# Patient Record
Sex: FEMALE | Race: WHITE | NOT HISPANIC OR LATINO | URBAN - METROPOLITAN AREA
[De-identification: names, ages, dates, MRNs, and addresses within clinical notes are randomized per-mention and may not be internally consistent; named-entity substitution may affect disease eponyms.]

---

## 2017-02-21 ENCOUNTER — HISTORICAL (OUTPATIENT)
Dept: LAB | Facility: HOSPITAL | Age: 62
End: 2017-02-21

## 2018-08-08 ENCOUNTER — HISTORICAL (OUTPATIENT)
Dept: LAB | Facility: HOSPITAL | Age: 63
End: 2018-08-08

## 2018-08-08 ENCOUNTER — HISTORICAL (OUTPATIENT)
Dept: ADMINISTRATIVE | Facility: HOSPITAL | Age: 63
End: 2018-08-08

## 2018-08-08 LAB
APPEARANCE, UA: ABNORMAL
BACTERIA #/AREA URNS AUTO: ABNORMAL /HPF
BILIRUB UR QL STRIP: NEGATIVE MG/DL
COLOR UR: YELLOW
GLUCOSE (UA): NEGATIVE MG/DL
HGB UR QL STRIP: ABNORMAL UNIT/L
KETONES UR QL STRIP: NEGATIVE MG/DL
LEUKOCYTE ESTERASE UR QL STRIP: ABNORMAL UNIT/L
NITRITE UR QL STRIP.AUTO: NEGATIVE
PH UR STRIP: 7 [PH]
PROT UR QL STRIP: NEGATIVE MG/DL
RBC #/AREA URNS HPF: ABNORMAL /HPF
SP GR UR STRIP: 1.01
SQUAMOUS EPITHELIAL, UA: ABNORMAL /LPF
UROBILINOGEN UR STRIP-ACNC: 0.2 MG/DL
WBC #/AREA URNS AUTO: ABNORMAL /[HPF]

## 2019-03-26 ENCOUNTER — HISTORICAL (OUTPATIENT)
Dept: ADMINISTRATIVE | Facility: HOSPITAL | Age: 64
End: 2019-03-26

## 2019-03-26 LAB
APPEARANCE, UA: CLEAR
BACTERIA #/AREA URNS AUTO: ABNORMAL /HPF
BILIRUB UR QL STRIP: NEGATIVE MG/DL
COLOR UR: ABNORMAL
GLUCOSE (UA): NEGATIVE MG/DL
HGB UR QL STRIP: ABNORMAL UNIT/L
KETONES UR QL STRIP: NEGATIVE MG/DL
LEUKOCYTE ESTERASE UR QL STRIP: ABNORMAL UNIT/L
NITRITE UR QL STRIP.AUTO: NEGATIVE
PH UR STRIP: 6 [PH]
PROT UR QL STRIP: NEGATIVE MG/DL
RBC #/AREA URNS HPF: ABNORMAL /HPF
SP GR UR STRIP: 1.01
SQUAMOUS EPITHELIAL, UA: ABNORMAL /LPF
UROBILINOGEN UR STRIP-ACNC: 0.2 MG/DL
WBC #/AREA URNS AUTO: ABNORMAL /[HPF]

## 2020-08-04 ENCOUNTER — HISTORICAL (OUTPATIENT)
Dept: ADMINISTRATIVE | Facility: HOSPITAL | Age: 65
End: 2020-08-04

## 2020-08-28 ENCOUNTER — HISTORICAL (OUTPATIENT)
Dept: ADMINISTRATIVE | Facility: HOSPITAL | Age: 65
End: 2020-08-28

## 2020-10-02 ENCOUNTER — HISTORICAL (OUTPATIENT)
Dept: ADMINISTRATIVE | Facility: HOSPITAL | Age: 65
End: 2020-10-02

## 2020-10-02 LAB
ABS NEUT (OLG): 4.1 X10(3)/MCL (ref 2.1–9.2)
ALBUMIN SERPL-MCNC: 4.3 GM/DL (ref 3.4–5)
ALBUMIN/GLOB SERPL: 1.65 {RATIO} (ref 1.5–2.5)
ALP SERPL-CCNC: 83 UNIT/L (ref 38–126)
ALT SERPL-CCNC: 17 UNIT/L (ref 7–52)
APPEARANCE, UA: CLEAR
AST SERPL-CCNC: 19 UNIT/L (ref 15–37)
BACTERIA #/AREA URNS AUTO: NORMAL /HPF
BILIRUB SERPL-MCNC: 0.6 MG/DL (ref 0.2–1)
BILIRUB UR QL STRIP: NEGATIVE MG/DL
BILIRUBIN DIRECT+TOT PNL SERPL-MCNC: 0.1 MG/DL (ref 0–0.5)
BILIRUBIN DIRECT+TOT PNL SERPL-MCNC: 0.5 MG/DL
BUN SERPL-MCNC: 15 MG/DL (ref 7–18)
CALCIUM SERPL-MCNC: 9.7 MG/DL (ref 8.5–10)
CHLORIDE SERPL-SCNC: 105 MMOL/L (ref 98–107)
CHOLEST SERPL-MCNC: 190 MG/DL (ref 0–200)
CHOLEST/HDLC SERPL: 3.4 {RATIO}
CO2 SERPL-SCNC: 27 MMOL/L (ref 21–32)
COLOR UR: YELLOW
CREAT SERPL-MCNC: 0.76 MG/DL (ref 0.6–1.3)
DEPRECATED CALCIDIOL+CALCIFEROL SERPL-MC: 35.8 NG/ML (ref 30–80)
ERYTHROCYTE [DISTWIDTH] IN BLOOD BY AUTOMATED COUNT: 12.1 % (ref 11.5–17)
GLOBULIN SER-MCNC: 2.6 GM/DL (ref 1.2–3)
GLUCOSE (UA): NEGATIVE MG/DL
GLUCOSE SERPL-MCNC: 100 MG/DL (ref 74–106)
HCT VFR BLD AUTO: 42.5 % (ref 37–47)
HDLC SERPL-MCNC: 56 MG/DL (ref 35–60)
HGB BLD-MCNC: 14.2 GM/DL (ref 12–16)
HGB UR QL STRIP: NEGATIVE UNIT/L
KETONES UR QL STRIP: NEGATIVE MG/DL
LDLC SERPL CALC-MCNC: 118 MG/DL (ref 0–129)
LEUKOCYTE ESTERASE UR QL STRIP: NORMAL UNIT/L
LYMPHOCYTES # BLD AUTO: 2.5 X10(3)/MCL (ref 0.6–3.4)
LYMPHOCYTES NFR BLD AUTO: 34.4 % (ref 13–40)
MCH RBC QN AUTO: 32.3 PG (ref 27–31.2)
MCHC RBC AUTO-ENTMCNC: 33 GM/DL (ref 32–36)
MCV RBC AUTO: 97 FL (ref 80–94)
MONOCYTES # BLD AUTO: 0.6 X10(3)/MCL (ref 0.1–1.3)
MONOCYTES NFR BLD AUTO: 9 % (ref 0.1–24)
NEUTROPHILS NFR BLD AUTO: 56.6 % (ref 47–80)
NITRITE UR QL STRIP.AUTO: NEGATIVE
PH UR STRIP: 6.5 [PH]
PLATELET # BLD AUTO: 213 X10(3)/MCL (ref 130–400)
PMV BLD AUTO: 9.6 FL (ref 9.4–12.4)
POTASSIUM SERPL-SCNC: 4.6 MMOL/L (ref 3.5–5.1)
PROT SERPL-MCNC: 6.9 GM/DL (ref 6.4–8.2)
PROT UR QL STRIP: NEGATIVE MG/DL
RBC # BLD AUTO: 4.39 X10(6)/MCL (ref 4.2–5.4)
RBC #/AREA URNS HPF: NORMAL /HPF
SODIUM SERPL-SCNC: 141 MMOL/L (ref 136–145)
SP GR UR STRIP: 1.02
SQUAMOUS EPITHELIAL, UA: NORMAL /LPF
TRIGL SERPL-MCNC: 155 MG/DL (ref 30–150)
UROBILINOGEN UR STRIP-ACNC: 0.2 MG/DL
VLDLC SERPL CALC-MCNC: 31 MG/DL
WBC # SPEC AUTO: 7.2 X10(3)/MCL (ref 4.5–11.5)
WBC #/AREA URNS AUTO: NORMAL /[HPF]

## 2021-01-12 ENCOUNTER — HISTORICAL (OUTPATIENT)
Dept: ADMINISTRATIVE | Facility: HOSPITAL | Age: 66
End: 2021-01-12

## 2021-01-12 LAB
ALBUMIN SERPL-MCNC: 3.9 GM/DL (ref 3.4–5)
ALBUMIN/GLOB SERPL: 1.26 {RATIO} (ref 1.5–2.5)
ALP SERPL-CCNC: 366 UNIT/L (ref 38–126)
ALT SERPL-CCNC: 154 UNIT/L (ref 7–52)
AST SERPL-CCNC: 40 UNIT/L (ref 15–37)
BILIRUB SERPL-MCNC: 0.7 MG/DL (ref 0.2–1)
BILIRUBIN DIRECT+TOT PNL SERPL-MCNC: 0.2 MG/DL (ref 0–0.5)
BILIRUBIN DIRECT+TOT PNL SERPL-MCNC: 0.5 MG/DL
BUN SERPL-MCNC: 16 MG/DL (ref 7–18)
CALCIUM SERPL-MCNC: 9.4 MG/DL (ref 8.5–10)
CHLORIDE SERPL-SCNC: 101 MMOL/L (ref 98–107)
CO2 SERPL-SCNC: 31 MMOL/L (ref 21–32)
CREAT SERPL-MCNC: 0.7 MG/DL (ref 0.6–1.3)
GLOBULIN SER-MCNC: 3.1 GM/DL (ref 1.2–3)
GLUCOSE SERPL-MCNC: 104 MG/DL (ref 74–106)
POTASSIUM SERPL-SCNC: 4.3 MMOL/L (ref 3.5–5.1)
PROT SERPL-MCNC: 7 GM/DL (ref 6.4–8.2)
SODIUM SERPL-SCNC: 138 MMOL/L (ref 136–145)

## 2021-01-20 ENCOUNTER — HISTORICAL (OUTPATIENT)
Dept: ADMINISTRATIVE | Facility: HOSPITAL | Age: 66
End: 2021-01-20

## 2021-01-20 LAB
APPEARANCE, UA: ABNORMAL
BACTERIA #/AREA URNS AUTO: ABNORMAL /HPF
BILIRUB UR QL STRIP: NEGATIVE MG/DL
COLOR UR: YELLOW
GLUCOSE (UA): NEGATIVE MG/DL
HGB UR QL STRIP: NEGATIVE UNIT/L
KETONES UR QL STRIP: NEGATIVE MG/DL
LEUKOCYTE ESTERASE UR QL STRIP: ABNORMAL UNIT/L
NITRITE UR QL STRIP.AUTO: NEGATIVE
PH UR STRIP: 7 [PH]
PROT UR QL STRIP: NEGATIVE MG/DL
RBC #/AREA URNS HPF: ABNORMAL /HPF
SP GR UR STRIP: 1.02
SQUAMOUS EPITHELIAL, UA: ABNORMAL /LPF
UROBILINOGEN UR STRIP-ACNC: 0.2 MG/DL
WBC #/AREA URNS AUTO: ABNORMAL /[HPF]

## 2021-01-27 ENCOUNTER — HISTORICAL (OUTPATIENT)
Dept: ADMINISTRATIVE | Facility: HOSPITAL | Age: 66
End: 2021-01-27

## 2021-01-27 LAB
ALBUMIN SERPL-MCNC: 4.1 GM/DL (ref 3.4–5)
ALBUMIN/GLOB SERPL: 1.58 {RATIO} (ref 1.5–2.5)
ALP SERPL-CCNC: 124 UNIT/L (ref 38–126)
ALT SERPL-CCNC: 18 UNIT/L (ref 7–52)
AST SERPL-CCNC: 19 UNIT/L (ref 15–37)
BILIRUB SERPL-MCNC: 0.6 MG/DL (ref 0.2–1)
BILIRUBIN DIRECT+TOT PNL SERPL-MCNC: 0.1 MG/DL (ref 0–0.5)
BILIRUBIN DIRECT+TOT PNL SERPL-MCNC: 0.5 MG/DL
BUN SERPL-MCNC: 12 MG/DL (ref 7–18)
CALCIUM SERPL-MCNC: 9.1 MG/DL (ref 8.5–10)
CHLORIDE SERPL-SCNC: 107 MMOL/L (ref 98–107)
CO2 SERPL-SCNC: 29 MMOL/L (ref 21–32)
CREAT SERPL-MCNC: 0.75 MG/DL (ref 0.6–1.3)
FERRITIN SERPL-MCNC: 342.97 NG/ML (ref 4.63–204)
GLOBULIN SER-MCNC: 2.6 GM/DL (ref 1.2–3)
GLUCOSE SERPL-MCNC: 97 MG/DL (ref 74–106)
POTASSIUM SERPL-SCNC: 4.2 MMOL/L (ref 3.5–5.1)
PROT SERPL-MCNC: 6.7 GM/DL (ref 6.4–8.2)
SODIUM SERPL-SCNC: 141 MMOL/L (ref 136–145)

## 2021-02-03 ENCOUNTER — HISTORICAL (OUTPATIENT)
Dept: ADMINISTRATIVE | Facility: HOSPITAL | Age: 66
End: 2021-02-03

## 2021-02-03 LAB
ANTINUCLEAR ANTIBODY SCREEN (OHS): NEGATIVE
APPEARANCE, UA: CLEAR
BACTERIA #/AREA URNS AUTO: ABNORMAL /HPF
BILIRUB UR QL STRIP: NEGATIVE MG/DL
COLOR UR: YELLOW
DEPRECATED CALCIDIOL+CALCIFEROL SERPL-MC: 36.1 NG/ML (ref 30–80)
DSDNA ANTIBODY (OHS): NEGATIVE
FERRITIN SERPL-MCNC: 324.13 NG/ML (ref 4.63–204)
GLUCOSE (UA): NEGATIVE MG/DL
HGB UR QL STRIP: NEGATIVE UNIT/L
KETONES UR QL STRIP: NEGATIVE MG/DL
LEUKOCYTE ESTERASE UR QL STRIP: ABNORMAL UNIT/L
NITRITE UR QL STRIP.AUTO: NEGATIVE
PH UR STRIP: 7 [PH]
PROT UR QL STRIP: NEGATIVE MG/DL
RBC #/AREA URNS HPF: ABNORMAL /HPF
RHEUMATOID FACT SERPL-ACNC: <13 IU/ML
SP GR UR STRIP: 1.02
SQUAMOUS EPITHELIAL, UA: ABNORMAL /LPF
UROBILINOGEN UR STRIP-ACNC: 0.2 MG/DL
WBC #/AREA URNS AUTO: ABNORMAL /[HPF]

## 2021-04-26 ENCOUNTER — HISTORICAL (OUTPATIENT)
Dept: ADMINISTRATIVE | Facility: HOSPITAL | Age: 66
End: 2021-04-26

## 2021-04-26 LAB
ALBUMIN SERPL-MCNC: 4.2 GM/DL (ref 3.4–5)
ALBUMIN/GLOB SERPL: 1.62 {RATIO} (ref 1.5–2.5)
ALP SERPL-CCNC: 80 UNIT/L (ref 38–126)
ALT SERPL-CCNC: 17 UNIT/L (ref 7–52)
APPEARANCE, UA: CLEAR
AST SERPL-CCNC: 19 UNIT/L (ref 15–37)
BACTERIA #/AREA URNS AUTO: ABNORMAL /HPF
BILIRUB SERPL-MCNC: 0.5 MG/DL (ref 0.2–1)
BILIRUB UR QL STRIP: NEGATIVE MG/DL
BILIRUBIN DIRECT+TOT PNL SERPL-MCNC: 0.1 MG/DL (ref 0–0.5)
BILIRUBIN DIRECT+TOT PNL SERPL-MCNC: 0.4 MG/DL
BUN SERPL-MCNC: 17 MG/DL (ref 7–18)
CALCIUM SERPL-MCNC: 9.3 MG/DL (ref 8.5–10)
CHLORIDE SERPL-SCNC: 101 MMOL/L (ref 98–107)
CHOLEST SERPL-MCNC: 168 MG/DL (ref 0–200)
CHOLEST/HDLC SERPL: 3.1 {RATIO}
CO2 SERPL-SCNC: 28 MMOL/L (ref 21–32)
COLOR UR: YELLOW
CREAT SERPL-MCNC: 0.68 MG/DL (ref 0.6–1.3)
DEPRECATED CALCIDIOL+CALCIFEROL SERPL-MC: 39.7 NG/ML (ref 30–80)
GLOBULIN SER-MCNC: 2.6 GM/DL (ref 1.2–3)
GLUCOSE (UA): NEGATIVE MG/DL
GLUCOSE SERPL-MCNC: 99 MG/DL (ref 74–106)
HDLC SERPL-MCNC: 55 MG/DL (ref 35–60)
HGB UR QL STRIP: NEGATIVE UNIT/L
KETONES UR QL STRIP: NEGATIVE MG/DL
LDLC SERPL CALC-MCNC: 89 MG/DL (ref 0–129)
LEUKOCYTE ESTERASE UR QL STRIP: ABNORMAL UNIT/L
NITRITE UR QL STRIP.AUTO: NEGATIVE
PH UR STRIP: 6.5 [PH]
POTASSIUM SERPL-SCNC: 4.1 MMOL/L (ref 3.5–5.1)
PROT SERPL-MCNC: 6.8 GM/DL (ref 6.4–8.2)
PROT UR QL STRIP: NEGATIVE MG/DL
RBC #/AREA URNS HPF: ABNORMAL /HPF
SODIUM SERPL-SCNC: 140 MMOL/L (ref 136–145)
SP GR UR STRIP: 1.02
SQUAMOUS EPITHELIAL, UA: ABNORMAL /LPF
TRIGL SERPL-MCNC: 149 MG/DL (ref 30–150)
UROBILINOGEN UR STRIP-ACNC: 0.2 MG/DL
VLDLC SERPL CALC-MCNC: 29.8 MG/DL
WBC #/AREA URNS AUTO: ABNORMAL /[HPF]

## 2021-05-05 ENCOUNTER — HISTORICAL (OUTPATIENT)
Dept: ADMINISTRATIVE | Facility: HOSPITAL | Age: 66
End: 2021-05-05

## 2021-05-07 LAB — FINAL CULTURE: NORMAL

## 2021-09-29 ENCOUNTER — HISTORICAL (OUTPATIENT)
Dept: ADMINISTRATIVE | Facility: HOSPITAL | Age: 66
End: 2021-09-29

## 2021-09-29 LAB
ABS NEUT (OLG): 3.5 X10(3)/MCL (ref 2.1–9.2)
ALBUMIN SERPL-MCNC: 4.3 GM/DL (ref 3.4–5)
ALBUMIN/GLOB SERPL: 1.72 {RATIO} (ref 1.5–2.5)
ALP SERPL-CCNC: 69 UNIT/L (ref 38–126)
ALT SERPL-CCNC: 16 UNIT/L (ref 7–52)
APPEARANCE, UA: CLEAR
AST SERPL-CCNC: 17 UNIT/L (ref 15–37)
BACTERIA #/AREA URNS AUTO: ABNORMAL /HPF
BILIRUB SERPL-MCNC: 0.6 MG/DL (ref 0.2–1)
BILIRUB UR QL STRIP: NEGATIVE MG/DL
BILIRUBIN DIRECT+TOT PNL SERPL-MCNC: 0.1 MG/DL (ref 0–0.5)
BILIRUBIN DIRECT+TOT PNL SERPL-MCNC: 0.5 MG/DL
BUN SERPL-MCNC: 16 MG/DL (ref 7–18)
CALCIUM SERPL-MCNC: 9.2 MG/DL (ref 8.5–10)
CHLORIDE SERPL-SCNC: 104 MMOL/L (ref 98–107)
CHOLEST SERPL-MCNC: 196 MG/DL (ref 0–200)
CHOLEST/HDLC SERPL: 3.9 {RATIO}
CO2 SERPL-SCNC: 30 MMOL/L (ref 21–32)
COLOR UR: ABNORMAL
CREAT SERPL-MCNC: 0.83 MG/DL (ref 0.6–1.3)
DEPRECATED CALCIDIOL+CALCIFEROL SERPL-MC: 50.1 NG/ML (ref 30–80)
ERYTHROCYTE [DISTWIDTH] IN BLOOD BY AUTOMATED COUNT: 12.2 % (ref 11.5–17)
GLOBULIN SER-MCNC: 2.5 GM/DL (ref 1.2–3)
GLUCOSE (UA): NEGATIVE MG/DL
GLUCOSE SERPL-MCNC: 105 MG/DL (ref 74–106)
HCT VFR BLD AUTO: 41.7 % (ref 37–47)
HDLC SERPL-MCNC: 50 MG/DL (ref 35–60)
HGB BLD-MCNC: 14.3 GM/DL (ref 12–16)
HGB UR QL STRIP: ABNORMAL UNIT/L
KETONES UR QL STRIP: ABNORMAL MG/DL
LDLC SERPL CALC-MCNC: 102 MG/DL (ref 0–129)
LEUKOCYTE ESTERASE UR QL STRIP: ABNORMAL UNIT/L
LYMPHOCYTES # BLD AUTO: 2.3 X10(3)/MCL (ref 0.6–3.4)
LYMPHOCYTES NFR BLD AUTO: 34.5 % (ref 13–40)
MCH RBC QN AUTO: 32.7 PG (ref 27–31.2)
MCHC RBC AUTO-ENTMCNC: 34 GM/DL (ref 32–36)
MCV RBC AUTO: 95 FL (ref 80–94)
MONOCYTES # BLD AUTO: 0.8 X10(3)/MCL (ref 0.1–1.3)
MONOCYTES NFR BLD AUTO: 11.4 % (ref 0.1–24)
NEUTROPHILS NFR BLD AUTO: 54.1 % (ref 47–80)
NITRITE UR QL STRIP.AUTO: NEGATIVE
PH UR STRIP: 6 [PH]
PLATELET # BLD AUTO: 207 X10(3)/MCL (ref 130–400)
PMV BLD AUTO: 10 FL (ref 9.4–12.4)
POTASSIUM SERPL-SCNC: 4.6 MMOL/L (ref 3.5–5.1)
PROT SERPL-MCNC: 6.8 GM/DL (ref 6.4–8.2)
PROT UR QL STRIP: ABNORMAL MG/DL
RBC # BLD AUTO: 4.37 X10(6)/MCL (ref 4.2–5.4)
RBC #/AREA URNS HPF: ABNORMAL /HPF
SODIUM SERPL-SCNC: 141 MMOL/L (ref 136–145)
SP GR UR STRIP: >1.03
SQUAMOUS EPITHELIAL, UA: ABNORMAL /LPF
T3FREE SERPL-MCNC: 2.2 PG/ML (ref 1.45–3.48)
T4 FREE SERPL-MCNC: 0.99 NG/DL (ref 0.76–1.46)
TRIGL SERPL-MCNC: 188 MG/DL (ref 30–150)
TSH SERPL-ACNC: 1.51 MIU/ML (ref 0.35–4.94)
UROBILINOGEN UR STRIP-ACNC: 0.2 MG/DL
VLDLC SERPL CALC-MCNC: 37.6 MG/DL
WBC # SPEC AUTO: 6.6 X10(3)/MCL (ref 4.5–11.5)
WBC #/AREA URNS AUTO: ABNORMAL /[HPF]

## 2022-04-10 ENCOUNTER — HISTORICAL (OUTPATIENT)
Dept: ADMINISTRATIVE | Facility: HOSPITAL | Age: 67
End: 2022-04-10

## 2022-04-11 ENCOUNTER — HISTORICAL (OUTPATIENT)
Dept: ADMINISTRATIVE | Facility: HOSPITAL | Age: 67
End: 2022-04-11

## 2022-04-28 VITALS
DIASTOLIC BLOOD PRESSURE: 81 MMHG | BODY MASS INDEX: 28.41 KG/M2 | HEIGHT: 69 IN | SYSTOLIC BLOOD PRESSURE: 127 MMHG | WEIGHT: 191.81 LBS

## 2022-04-28 VITALS
WEIGHT: 191.81 LBS | DIASTOLIC BLOOD PRESSURE: 81 MMHG | BODY MASS INDEX: 28.41 KG/M2 | HEIGHT: 69 IN | SYSTOLIC BLOOD PRESSURE: 127 MMHG

## 2022-04-30 NOTE — OP NOTE
Patient:   Maria C Toney             MRN: [id number]            FIN: [id number]               Age:   64 years     Sex:  Female     :  1955   Associated Diagnoses:   None   Author:   Hieu Waters MD      Preoperative Diagnosis: Cataract Left eye    Postoperative Diagnosis: Cataract Left eye    Procedure: Phacoemulsification with intaocular lens implantations Left eye    Surgeon: Hieu Waters MD    Assistant: Zunilda Demarco, [facility]    Anestheisa: MAC    Complications: None    The patient was brought into the operating suite, where the patient was correctly identified as was the operative eye via timeout.  The patient was prepped and draped in a sterile ophthalmic fashion.  A lid speculum was placed in the operative eye and the microscope was brought into place.  A 1.0mm paracentesis was then made at (6) o'clock.  The anterior chamber was filled with Endocoat.  A (temporal) clear corneal incision was made with a 2.4 mm keratome.  A 6 mm corneal marking ring was used to jair the cornea centered over the visual axis.  A 5.00 mm continuous curvilinear capsulorhexis was fashioned using a cystotome and microcapsular forceps.  Hydrodissection and hydrodelineation was performed with upreserved 1% Xylocaine.  The nucleus was then phacoemulsified with the Abbott phacoemulsification hand-piece with a total of (3) EFX.  The cortex was then removed with the I/A hand-piece. The lens model ([id number]) with a power of (21.0) was placed in the capsular bag.  The Helon was then removed from the eye with the I/A hand piece.  The anterior chamber was inflated and the wounds were hydrated with BSS.  The wounds were checked with Weck-Raquel sponges and found to be watertight.  The lid speculum was removed and topical antibiotics were placed on the operative eye.  The patient was brought to PACU in good condition.

## 2022-04-30 NOTE — OP NOTE
Patient:   Maria C Toney             MRN: [id number]            FIN: [id number]               Age:   64 years     Sex:  Female     :  1955   Associated Diagnoses:   None   Author:   Hieu Waters MD      PREOPERATIVE DIAGNOSIS: Cataract Right eye    POSTOPERATIVE DIAGNOSIS: Cataract Right eye    PROCEDURE: Phacoemulsification with intraocular lens implantations Right eye    SURGEON: Hieu Waters MD    ASSISTANT: Zunilda Demarco, [facility]    ANESTHEISA: MAC    COMPLICATIONS: NONE    DESCRIPTION OF PROCEDURE: The patient was to the [employer] laser.  The patient was marked at 0 & 180 degrees.   A time out was performed.  The capsulotomy and lens fragmentation were completed.  Then the patient was brought into the operating suite, where the patient was correctly identified as was the operative eye via timeout.  The patient was prepped and draped in a sterile ophthalmic fashion.  A lid speculum was placed in the operative eye and the microscope was brought into place.  The eye was then marked using a axis marker for the desired position of the IOL implant.  A 1.0 mm paracentesis was then made at (12) o'clock.  The anterior chamber was filled with Endocoat.  A (temporal) clear corneal incision was made with a 2.4 mm keratome blade.  A 6.0 mm corneal marking ring was used to jair the cornea centered over the visual axis.  A 5.00 mm continuous curvilinear capsulorhexis was fashioned using a cystotome and microcapsular forceps.  Hydrodissection and hydrodelineation was performed with unpreserved 1% Xylocaine.  The nucleus was then phacoemulsified with the Abbott phacoemulsification hand-piece with a total of (7) EFX.  The cortex was then removed with the I/A hand-piece.  The capsular bag was filled with Helon.  The lens model ([id number]) with the power of (22.0) was placed in the capsular bag at (30) degrees.  The Helon was then removed from the eye with the I/A hand -piece.  The anterior chamber was inflated and the  wounds were hydrated with BSS.  The wounds were checked with Weck-Raquel sponges and found to be watertight.  The lid speculum was removed and topical antibiotics were placed on the operative eye.  The patient was brought to the PACU in good condition.

## 2022-05-03 NOTE — HISTORICAL [facility] CERNER
This is a historical note converted from Jose Elias. Formatting and pictures may have been removed.  Please reference Jose Elias for original formatting and attached multimedia. Chief Complaint  2 WEEK   History of Present Illness  Patient presents to follow up regarding her elevated LFTs and UTI. Denies any UTI symptoms.  ?  ENT: Dr. Marsh. Chronic rhinitis. Patient states ENT thinks the problem could be related to an autoimmune process. She is unsure if her hypothyroidism is related to an autoimmune process. +arthralgia. +fatigue. She is considering testosterone replacement therapy with her endocrinologist, Dr. Mcdowell, due to chronic fatigue. She?reports having?carpet in her home. She is in the process of changing her floors.  ?  ?She has questions regarding the COVID-19 vaccine.  Review of Systems  General:??? Denies weight change.??Denies fever,chills, night sweats, or weakness. ?Reports fatigue.  HEENT:?? Denies vision changes or eye pain.??No sore throat, ear pain, sinus pressure or discharge.  Cardiovascular:?? Denies chest pain, palpitations, dyspnea on exertion, orthopnea.  Respiratory:?? No cough, wheezing, shortness of breath, or sputum.  GI:?? Denies nausea, emesis, constipation, diarrhea, melena, hematochezia or abdominal pain  :?? No frequency, urgency, hematuria, discharge, or incontinence  MS:?? Denies myalgias, arthralgias, joint effusion, edema, or weakness  Neuro:?? No headaches, numbness in extremities, tingling, dizziness, or weakness  Endo:?? Denies polyuria, polydipsia, polyphagia  Heme:?? No abnormal bleeding or bruising. No lymph node enlargement or pain.  ?  Physical Exam  Vitals & Measurements  HR:?76(Peripheral)? BP:?126/75?  HT:?175?cm? HT:?171.00?cm? WT:?88.4?kg? WT:?88.400?kg? BMI:?30.23?  General:?? Well-developed and??nourished, no apparent distress, alert and oriented??4.  Neck:?? Supple, no lymphadenopathy, no thyromegaly, no bruits, no jugular venous distention.  Cardiovascular:??  Regular rhythm and rate, no murmurs, radial and dorsal pedal pulses 2+ bilaterally.  Respiratory:?? Lungs clear to auscultation bilaterally, no wheezes, no crackles, no rhonchi.??Good air movement.  Abdomen:?? NABS, soft, nontender, no hepatosplenomegaly, no masses, no guarding or rebound.?  MS:?? No CCE. No joint effusions or erythema.  Neuro:?? CN II-XII intact_  Psych: Normal affect, patient calm, good historian, patient answers questions?appropriately. Good hygiene.  Heme:? No bruising or petechiae.  ?  Assessment/Plan  1.?Recurrent UTI?N39.0  Lab today: UA  2.?Elevated LFTs?R79.89  CMP on 1/27/21 - LFTs have returned to normal  3.?Hypothyroidism?E03.9  ?Lab today: TPO  4.?Arthralgia?M25.50  Lab today:?RF, KATHY  5.?Vitamin D deficiency?E55.9  Lab today: Vit D  6.?Fatigue?R53.83  7.?AR (allergic rhinitis)?J30.9  Continue to follow up with ENT. Consider allergy testing. Decrease potential allergens in home - carpets, drapes.  8.?Need for COVID-19 vaccine?Z23  ?Patient was educated on the vaccine and encouraged to obtain vaccination once available to her.  Referrals  Clinic Follow up, Order for future visit  Clinic Follow up, *Est. 05/05/21 8:15:00 CDT, Order for future visit, Fatigue, [facility]  Clinic Follow-up PRN, 02/03/21 9:15:00 CST, [facility], Future Order   Problem List/Past Medical History  Ongoing  Cataract  Hypercholesteremia  Hypothyroidism  Restless leg syndrome  Vitamin D deficiency  Historical  No qualifying data  Procedure/Surgical History  71528 - LT CATARACT W/IOL 1 STA PHACO (Left) (08/28/2020)  Extracapsular cataract removal with insertion of intraocular lens prosthesis (1 stage procedure), manual or mechanical technique (eg, irrigation and aspiration or phacoemulsification); without endoscopic cyclophotocoagulation (08/28/2020)  Replacement of Left Lens with Synthetic Substitute, Percutaneous Approach (08/28/2020)  00984 - RT CATARACT W/IOL 1 STA PHACO (Right) (08/04/2020)  53482 - RT  CATARACT W/IOL 1 STA PHACO (Right) (08/04/2020)  Extracapsular cataract removal with insertion of intraocular lens prosthesis (1 stage procedure), manual or mechanical technique (eg, irrigation and aspiration or phacoemulsification); without endoscopic cyclophotocoagulation (08/04/2020)  Replacement of Right Lens with Synthetic Substitute, Percutaneous Approach (08/04/2020)  Colonoscopy (2015)  Cholecystectomy (2005)  Hysterectomy (2000)  breast biopsy (1973)  Appendectomy (1970)   Medications  azelastine 137 mcg/inh (0.1%) nasal spray, 2 spray(s), Both Nostrils, BID  cetirizine 10 mg oral tablet, 10 mg= 1 tab(s), Oral, Daily  CoQ10, 300 mg, Oral, Daily  Fioricet oral capsule, 2 cap(s), Oral, q4hr, PRN  fluticasone 50 mcg/inh nasal spray, 1 spray(s), Daily  gabapentin 300 mg oral capsule, 600 mg= 2 cap(s), Oral, TID, 3 refills  hydroCHLOROthiazide 12.5 mg oral capsule, 12.5 mg= 1 cap(s), Oral, Daily  LEVOTHYROXINE 175 MCG TAB, 175 mcg= 1 tab(s), Oral, Daily  lovastatin 20 mg oral tablet, See Instructions  MONTELUKAST SOD 10MG, 10 mg= 1 tab(s), Oral, Daily  pramipexole 0.5 mg oral tablet, 0.5 mg= 1 tab(s), Oral, At Bedtime, 3 refills  Vitamin D, 2000 units, Oral, Daily  Allergies  Bactrim?(Medication not effective)  Social History  Abuse/Neglect  No, 02/03/2021  No, No, Yes, 01/20/2021  Alcohol  Current, 1-2 times per month, 01/20/2021  Substance Use  Never, 01/20/2021  Tobacco  Never (less than 100 in lifetime), No, 02/03/2021  Never (less than 100 in lifetime), N/A, 01/20/2021  Family History  Family history is unknown  Immunizations  Vaccine Date Status   zoster vaccine, inactivated 10/08/2020 Given   pneumococcal 13-valent conjugate vaccine 10/02/2020 Given   tetanus/diphtheria/pertussis, acel(Tdap) 10/02/2020 Given   tetanus/diphth/pertuss (Tdap) adult/adol 05/28/2009 Recorded   Health Maintenance  Health Maintenance  ???Pending?(in the next year)  ??? ??OverDue  ??? ? ? ?Influenza Vaccine due??10/01/20??and  every 1??day(s)  ??? ? ? ?Advance Directive due??01/02/21??and every 1??year(s)  ??? ? ? ?Cognitive Screening due??01/02/21??and every 1??year(s)  ??? ? ? ?Functional Assessment due??01/02/21??and every 1??year(s)  ??? ??Due?  ??? ? ? ?Alcohol Misuse Screening due??01/02/21??and every 1??year(s)  ??? ? ? ?ADL Screening due??02/06/21??and every 1??year(s)  ??? ? ? ?Aspirin Therapy for CVD Prevention due??02/06/21??and every 1??year(s)  ??? ? ? ?Zoster Vaccine due??02/06/21??Unknown Frequency  ??? ??Due In Future?  ??? ? ? ?Obesity Screening not due until??01/01/22??and every 1??year(s)  ??? ? ? ?Fall Risk Assessment not due until??01/02/22??and every 1??year(s)  ??? ? ? ?Depression Screening not due until??01/20/22??and every 1??year(s)  ??? ? ? ?Blood Pressure Screening not due until??02/03/22??and every 1??year(s)  ??? ? ? ?Body Mass Index Check not due until??02/03/22??and every 1??year(s)  ???Satisfied?(in the past 1 year)  ??? ??Satisfied?  ??? ? ? ?Advance Directive on??08/28/20.??Satisfied by Vivi Navarrete RN  ??? ? ? ?Blood Pressure Screening on??02/03/21.??Satisfied by Dell Hernandes  ??? ? ? ?Body Mass Index Check on??02/03/21.??Satisfied by Dell Hernandes  ??? ? ? ?Bone Density Screening on??11/06/20.??Satisfied by Caryn Diaz  ??? ? ? ?Breast Cancer Screening on??11/06/20.??Satisfied by Caryn Diaz  ??? ? ? ?Depression Screening on??01/20/21.??Satisfied by Sonja Bunch  ??? ? ? ?Diabetes Screening on??01/27/21.??Satisfied by Jodie Raya  ??? ? ? ?Fall Risk Assessment on??01/20/21.??Satisfied by Sonja Bunch  ??? ? ? ?Functional Assessment on??08/28/20.??Satisfied by Vivi Navarrete RN  ??? ? ? ?Influenza Vaccine on??02/03/21.??Satisfied by Dell Hernandes  ??? ? ? ?Lipid Screening on??10/02/20.??Satisfied by Jodie Raya  ??? ? ? ?Obesity Screening on??02/03/21.??Satisfied by Dell Hernandes  ??? ? ? ?Pneumococcal Vaccine on??10/02/20.??Satisfied by Rachel Singh  ??? ? ? ?Tetanus Vaccine  on??10/02/20.??Satisfied by Rachel Singh  ??? ? ? ?Zoster Vaccine on??10/08/20.??Satisfied by Rachel Singh  ?  Lab Results  Test Name Test Result Date/Time   Glucose Lvl 97.0 mg/dL 01/27/2021 07:24 CST   BUN 12.0 mg/dL 01/27/2021 07:24 CST   Creatinine 0.75 mg/dL 01/27/2021 07:24 CST   AST 19.0 unit/L 01/27/2021 07:24 CST   ALT 18.0 unit/L 01/27/2021 07:24 CST   Ferritin Lvl 342.97 ng/mL (High) 01/27/2021 09:56 CST       Patient condition discussed?in detail with nurse practitioner.??Agree with plan of care?and follow-up.  ?

## 2022-05-03 NOTE — HISTORICAL [facility] CERNER
This is a historical note converted from Jose Elias. Formatting and pictures may have been removed.  Please reference Jose Elias for original formatting and attached multimedia. Chief Complaint  3 MTH RC  History of Present Illness  Patient is here for follow-up of anxiety. ?She?saw Dr Hernández for RLS. He treated her with gabapentin. She developed anxiety in last few months. Dr ABAD tried her on Wellbutrin XL 150mg. Seems to be working well.? Denies side effects.  Also,?just completed Rx for UTI. 2nd this year.  No exercise. Works at home, runs a .  Took her 1st COVID vaccine on 4-12-21, Moderna. Suggested she repeat her Shingrix 1-2 months after 2nd COVID vaccine dose.  Review of Systems  General:??????????????? Patient reports energy level is good.??Denies fever,chills, night sweats, fatigue or weakness.  HEENT:?????????????????? Denies vision changes or eye pain.? No sore throat, ear pain, sinus pressure or discharge.  Cardiovascular:??? Denies chest pain, palpitations, dyspnea on exertion, orthopnea.  Respiratory:???????? No cough, wheezing, shortness of breath, or sputum.  GI:????????????????????????? Denies nausea, emesis, constipation, diarrhea, melena, hematochezia or abdominal pain  :??????????????????????? No frequency, urgency, hematuria, discharge, or incontinence  MS:?????????????????????? Denies myalgias, arthralgias, joint effusion, edema, or weakness  Neuro:????????????????? No headaches, numbness in extremities, tingling, dizziness, or weakness  Psych:?????????????????? See HPI. ?Mood is controlled.? Denies suicidal or?homicidal ideations.  Endo:??????????????????? Denies polyuria, polydipsia, polyphagia  Heme:????????????????? No abnormal bleeding or bruising. No lymph node enlargement or pain.  Physical Exam  Vitals & Measurements  HR:?78(Peripheral)? BP:?123/73?  HT:?175.26?cm? HT:?175.26?cm? WT:?85.200?kg? WT:?85.2?kg? BMI:?27.74?  General :?????Well-developed and? nourished, no apparent  distress, alert and oriented ?4  Neck :?????Supple, no lymphadenopathy, no thyromegaly, no bruits, no jugular venous distention  Cardiovascular :?????? Regular rhythm and rate, no murmurs, radial and dorsal pedal pulses 2+ bilaterally  Respiratory :??????Lungs clear to auscultation bilaterally, no wheezes, no crackles, no rhonchi.? Good air movement  Abdomen :?????? ?NABS, soft, nontender, no hepatosplenomegaly, no masses, no guarding or rebound????????????????????????  MS :??????? No muscle tenderness, joints WNL, FROM, negative SLR, no?CCE  Neuro :? ?Grossly intact  Heme :?????? No bruising or petechiae?  Back:??????? no CVAT  Assessment/Plan  1.?Anxiety?F41.9  ?Refilled Wellbutrin for 6 months.  Ordered:  Clinic Follow-up PRN, 05/05/21 14:40:00 CDT, [facility], Future Order  Office/Outpatient Visit Level 3 Established 89608 PC, Anxiety  Restless leg syndrome  UTI (urinary tract infection)  Vitamin D deficiency, [facility], 05/05/21 14:40:00 CDT  ?  2.?Restless leg syndrome?G25.81  ?Controlled with gabapentin  Ordered:  Clinic Follow-up PRN, 05/05/21 14:40:00 CDT, [facility], Future Order  Office/Outpatient Visit Level 3 Established 75018 PC, Anxiety  Restless leg syndrome  UTI (urinary tract infection)  Vitamin D deficiency, [facility], 05/05/21 14:40:00 CDT  ?  3.?UTI (urinary tract infection)?N39.0  Patient is asymptomatic. ?UA is somewhat abnormal.??This could be result of menopause?and friable skin. ?We will send urine for culture. ?If positive would treat.  Ordered:  Clinic Follow-up PRN, 05/05/21 14:40:00 CDT, [facility], Future Order  Lab Collection Request, 05/05/21 14:39:00 CDT, [facility], 05/05/21 14:39:00 CDT, UTI (urinary tract infection)  Office/Outpatient Visit Level 3 Established 87564 PC, Anxiety  Restless leg syndrome  UTI (urinary tract infection)  Vitamin D deficiency, [facility], 05/05/21 14:40:00 CDT  Urine Culture [id number], Routine collect, 05/05/21  14:39:00 CDT, Order for future visit, Urine, Stop date 05/05/21 14:39:00 CDT, UTI (urinary tract infection)  ?  4.?Vitamin D deficiency?E55.9  Ordered:  Clinic Follow-up PRN, 05/05/21 14:40:00 CDT, [facility], Future Order  Office/Outpatient Visit Level 3 Established 36943 PC, Anxiety  Restless leg syndrome  UTI (urinary tract infection)  Vitamin D deficiency, [facility], 05/05/21 14:40:00 CDT  ?  Orders:  buPROPion, 150 mg = 1 tab(s), Oral, q24hr, # 90 tab(s), 1 Refill(s), Pharmacy: [facility], 175.26, cm, Height/Length Dosing, 05/05/21 13:41:00 CDT, 85.2, kg, Weight Dosing, 05/05/21 13:41:00 CDT  Referrals  Clinic Follow-up PRN, 05/05/21 14:40:00 CDT, [facility], Future Order   Problem List/Past Medical History  Ongoing  Cataract  Hypercholesteremia  Hypothyroidism  Restless leg syndrome  Vitamin D deficiency  Historical  No qualifying data  Procedure/Surgical History  37810 - LT CATARACT W/IOL 1 STA PHACO (Left) (08/28/2020)  Extracapsular cataract removal with insertion of intraocular lens prosthesis (1 stage procedure), manual or mechanical technique (eg, irrigation and aspiration or phacoemulsification); without endoscopic cyclophotocoagulation (08/28/2020)  Replacement of Left Lens with Synthetic Substitute, Percutaneous Approach (08/28/2020)  20320 - RT CATARACT W/IOL 1 STA PHACO (Right) (08/04/2020)  86527 - RT CATARACT W/IOL 1 STA PHACO (Right) (08/04/2020)  Extracapsular cataract removal with insertion of intraocular lens prosthesis (1 stage procedure), manual or mechanical technique (eg, irrigation and aspiration or phacoemulsification); without endoscopic cyclophotocoagulation (08/04/2020)  Replacement of Right Lens with Synthetic Substitute, Percutaneous Approach (08/04/2020)  Colonoscopy (2015)  Cholecystectomy (2005)  Hysterectomy (2000)  breast biopsy (1973)  Appendectomy (1970)   Medications  azelastine 137 mcg/inh (0.1%) nasal spray, 2 spray(s), Both Nostrils,  BID  cetirizine 10 mg oral tablet, 10 mg= 1 tab(s), Oral, Daily  CoQ10, 300 mg, Oral, Daily  Fioricet oral capsule, 2 cap(s), Oral, q4hr, PRN  fluticasone 50 mcg/inh nasal spray, 1 spray(s), Daily  gabapentin 300 mg oral capsule, 300 mg= 1 cap(s), Oral, At Bedtime, 3 refills  hydroCHLOROthiazide 12.5 mg oral capsule, 12.5 mg= 1 cap(s), Oral, Daily  LEVOTHYROXINE 175 MCG TAB, 175 mcg= 1 tab(s), Oral, Daily  lovastatin 20 mg oral tablet, See Instructions  Vitamin D, 5000 units, Oral, Daily  Wellbutrin  mg/24 hours oral tablet, extended release, 150 mg= 1 tab(s), Oral, q24hr, 1 refills  Allergies  Bactrim?(Medication not effective)  Social History  Abuse/Neglect  No, 05/05/2021  No, 03/01/2021  Alcohol  Current, 1-2 times per month, 03/01/2021  Substance Use  Never, 01/20/2021  Tobacco  Never (less than 100 in lifetime), No, 05/05/2021  Never (less than 100 in lifetime), N/A, 03/01/2021  Family History  Family history is unknown  Immunizations  Vaccine Date Status   COVID-19 mRNA, LNP-S, PF - Moderna 04/12/2021 Recorded   zoster vaccine, inactivated 10/08/2020 Given   pneumococcal 13-valent conjugate vaccine 10/02/2020 Given   tetanus/diphtheria/pertussis, acel(Tdap) 10/02/2020 Given   tetanus/diphth/pertuss (Tdap) adult/adol 05/28/2009 Recorded   Health Maintenance  Health Maintenance  ???Pending?(in the next year)  ??? ??OverDue  ??? ? ? ?Influenza Vaccine due??10/01/20??and every 1??day(s)  ??? ? ? ?Advance Directive due??01/02/21??and every 1??year(s)  ??? ? ? ?Alcohol Misuse Screening due??01/02/21??and every 1??year(s)  ??? ? ? ?Cognitive Screening due??01/02/21??and every 1??year(s)  ??? ? ? ?Functional Assessment due??01/02/21??and every 1??year(s)  ??? ??Due?  ??? ? ? ?ADL Screening due??05/05/21??and every 1??year(s)  ??? ? ? ?Aspirin Therapy for CVD Prevention due??05/05/21??and every 1??year(s)  ??? ? ? ?Zoster Vaccine due??05/05/21??Unknown Frequency  ??? ??Due In Future?  ??? ? ? ?Obesity Screening  not due until??01/01/22??and every 1??year(s)  ??? ? ? ?Fall Risk Assessment not due until??01/02/22??and every 1??year(s)  ??? ? ? ?Depression Screening not due until??01/20/22??and every 1??year(s)  ???Satisfied?(in the past 1 year)  ??? ??Satisfied?  ??? ? ? ?Advance Directive on??08/28/20.??Satisfied by Vivi Navarrete RN  ??? ? ? ?Blood Pressure Screening on??05/05/21.??Satisfied by Dell Hernandes  ??? ? ? ?Body Mass Index Check on??05/05/21.??Satisfied by Dell Hernandes  ??? ? ? ?Bone Density Screening on??11/06/20.??Satisfied by Caryn Diaz  ??? ? ? ?Breast Cancer Screening on??11/06/20.??Satisfied by Caryn Diaz  ??? ? ? ?Depression Screening on??01/20/21.??Satisfied by Sonja Bunch  ??? ? ? ?Diabetes Screening on??04/26/21.??Satisfied by Jodie Raya  ??? ? ? ?Fall Risk Assessment on??03/01/21.??Satisfied by Ashley Ramos LPN  ??? ? ? ?Functional Assessment on??08/28/20.??Satisfied by Vivi Navarrete RN  ??? ? ? ?Influenza Vaccine on??03/01/21.??Satisfied by Ashley Ramos LPN  ??? ? ? ?Lipid Screening on??04/26/21.??Satisfied by Jodie Raya  ??? ? ? ?Obesity Screening on??05/05/21.??Satisfied by Dell Hernandes  ??? ? ? ?Pneumococcal Vaccine on??10/02/20.??Satisfied by Rachel Singh  ??? ? ? ?Tetanus Vaccine on??10/02/20.??Satisfied by Rachel Singh  ??? ? ? ?Zoster Vaccine on??10/08/20.??Satisfied by Rachel Singh  ?  Lab Results  Test Name Test Result Date/Time   UA Leuk Est 1+ (Abnormal) 05/05/2021 14:25 CDT   UA WBC cnt 2-5 (Abnormal) 05/05/2021 14:25 CDT   UA RBC 0-3 05/05/2021 14:25 CDT   UA Squam Epithelial Rare 05/05/2021 14:25 CDT

## 2022-05-03 NOTE — HISTORICAL [facility]ER
This is a historical note converted from Jose Elias. Formatting and pictures may have been removed.  Please reference Jose Elias for original formatting and attached multimedia. Chief Complaint  UTI RC  History of Present Illness  Patient presents to follow up regarding her UTI and elevated LFTs. Completed Macrobid on 1/13/21.? She denies any UTI symptoms.  She denies any abdominal pain or nausea or vomiting since?being off of Bactrim.  Review of Systems  General:??Denies weight change.??Denies fever,chills, night sweats, or weakness. ?  HEENT:?? Denies vision changes or eye pain.??No sore throat, ear pain, sinus pressure or discharge.  Cardiovascular:?? Denies chest pain, palpitations, dyspnea on exertion, orthopnea.  Respiratory:?? No cough, wheezing, shortness of breath, or sputum.  GI:?? Denies nausea, emesis, constipation, diarrhea, melena, hematochezia or abdominal pain  :?? No frequency, urgency, hematuria, discharge, or incontinence  Physical Exam  Vitals & Measurements  BP:?126/74?  HT:?175.00?cm? HT:?175?cm? WT:?87.5?kg?  General:?? Well-developed and??nourished, no apparent distress, alert and oriented??4.  Neck:?? Supple, no lymphadenopathy, no thyromegaly, no bruits, no jugular venous distention.  Cardiovascular:?? Regular rhythm and rate, no murmurs, radial and dorsal pedal pulses 2+ bilaterally.  Respiratory:?? Lungs clear to auscultation bilaterally, no wheezes, no crackles, no rhonchi.??Good air movement.  Abdomen:?? NABS, soft, nontender, no hepatosplenomegaly, no masses, no guarding or rebound.?  MS:?? No CCE.  Neuro:?? CN II-XII intact_  Psych: Normal affect, patient calm, good historian, patient answers questions?appropriately. Good hygiene.  ?  Assessment/Plan  1.?UTI (urinary tract infection)?N39.0  ?Patients UA remains positive for a?UTI. ?Will treat with another 7 days of Macrobid.  Macrobid 100 mg 1 p.o. every 12 hours #14x0.  2.?Elevated LFTs?R79.89  ?Repeat CMP next week.  3.?Restless leg  syndrome?G25.81  ?Patients neurologist is requesting a ferritin level. ?Ordered.  4.?Encounter for antibody response examination?Z01.84  ?Patient requesting antibody?testing for coronavirus. ?Lab ordered.  Referrals  Clinic Follow-up PRN, 01/20/21 12:14:00 CST, [facility], Future Order   Problem List/Past Medical History  Ongoing  Cataract  Hypercholesteremia  Hypothyroidism  Restless leg syndrome  Historical  No qualifying data  Procedure/Surgical History  99018 - LT CATARACT W/IOL 1 STA PHACO (Left) (08/28/2020)  Extracapsular cataract removal with insertion of intraocular lens prosthesis (1 stage procedure), manual or mechanical technique (eg, irrigation and aspiration or phacoemulsification); without endoscopic cyclophotocoagulation (08/28/2020)  Replacement of Left Lens with Synthetic Substitute, Percutaneous Approach (08/28/2020)  81773 - RT CATARACT W/IOL 1 STA PHACO (Right) (08/04/2020)  04660 - RT CATARACT W/IOL 1 STA PHACO (Right) (08/04/2020)  Extracapsular cataract removal with insertion of intraocular lens prosthesis (1 stage procedure), manual or mechanical technique (eg, irrigation and aspiration or phacoemulsification); without endoscopic cyclophotocoagulation (08/04/2020)  Replacement of Right Lens with Synthetic Substitute, Percutaneous Approach (08/04/2020)  Colonoscopy (2015)  Cholecystectomy (2005)  Hysterectomy (2000)  breast biopsy (1973)  Appendectomy (1970)   Medications  azelastine 137 mcg/inh (0.1%) nasal spray, 2 spray(s), Both Nostrils, BID  cetirizine 10 mg oral tablet, 10 mg= 1 tab(s), Oral, Daily  CoQ10, 300 mg, Oral, Daily  Fioricet oral capsule, 2 cap(s), Oral, q4hr, PRN  fluticasone 50 mcg/inh nasal spray, 1 spray(s), Daily  gabapentin 300 mg oral capsule, 600 mg= 2 cap(s), Oral, TID, 3 refills  hydroCHLOROthiazide 12.5 mg oral capsule, 12.5 mg= 1 cap(s), Oral, Daily  LEVOTHYROXINE 175 MCG TAB, 175 mcg= 1 tab(s), Oral, Daily  lovastatin 20 mg oral tablet, See  Instructions  Macrobid 100 mg oral capsule, 100 mg= 1 cap(s), Oral, BID  MONTELUKAST SOD 10MG, 10 mg= 1 tab(s), Oral, Daily  pramipexole 0.5 mg oral tablet, 0.5 mg= 1 tab(s), Oral, At Bedtime, 3 refills  Vitamin D, 2000 units, Oral, Daily  Allergies  Bactrim?(Medication not effective)  Social History  Abuse/Neglect  No, No, Yes, 01/20/2021  Alcohol  Current, 1-2 times per month, 01/20/2021  Substance Use  Never, 01/20/2021  Tobacco  Never (less than 100 in lifetime), N/A, 01/20/2021  Family History  Family history is unknown  Immunizations  Vaccine Date Status   zoster vaccine, inactivated 10/08/2020 Given   pneumococcal 13-valent conjugate vaccine 10/02/2020 Given   tetanus/diphtheria/pertussis, acel(Tdap) 10/02/2020 Given   tetanus/diphth/pertuss (Tdap) adult/adol 05/28/2009 Recorded   Health Maintenance  Health Maintenance  ???Pending?(in the next year)  ??? ??OverDue  ??? ? ? ?Influenza Vaccine due??10/01/20??and every 1??day(s)  ??? ? ? ?Advance Directive due??01/02/21??and every 1??year(s)  ??? ? ? ?Cognitive Screening due??01/02/21??and every 1??year(s)  ??? ? ? ?Functional Assessment due??01/02/21??and every 1??year(s)  ??? ??Due?  ??? ? ? ?Alcohol Misuse Screening due??01/02/21??and every 1??year(s)  ??? ? ? ?ADL Screening due??01/22/21??and every 1??year(s)  ??? ? ? ?Aspirin Therapy for CVD Prevention due??01/22/21??and every 1??year(s)  ??? ? ? ?Zoster Vaccine due??01/22/21??Unknown Frequency  ??? ??Due In Future?  ??? ? ? ?Obesity Screening not due until??01/01/22??and every 1??year(s)  ??? ? ? ?Fall Risk Assessment not due until??01/02/22??and every 1??year(s)  ??? ? ? ?Blood Pressure Screening not due until??01/20/22??and every 1??year(s)  ??? ? ? ?Body Mass Index Check not due until??01/20/22??and every 1??year(s)  ??? ? ? ?Depression Screening not due until??01/20/22??and every 1??year(s)  ???Satisfied?(in the past 1 year)  ??? ??Satisfied?  ??? ? ? ?Advance Directive on??08/28/20.??Satisfied by Rosalba  Vivi CACERES  ??? ? ? ?Blood Pressure Screening on??01/20/21.??Satisfied by Dell Hernandes  ??? ? ? ?Body Mass Index Check on??01/20/21.??Satisfied by Sonja Bunch  ??? ? ? ?Bone Density Screening on??11/06/20.??Satisfied by Caryn Diaz  ??? ? ? ?Breast Cancer Screening on??11/06/20.??Satisfied by Caryn Diaz  ??? ? ? ?Depression Screening on??01/20/21.??Satisfied by Sonja Bunch  ??? ? ? ?Diabetes Screening on??01/12/21.??Satisfied by Surinder Walls  ??? ? ? ?Fall Risk Assessment on??01/20/21.??Satisfied by Sonja Bunch  ??? ? ? ?Functional Assessment on??08/28/20.??Satisfied by Vivi Navarrete RN  ??? ? ? ?Influenza Vaccine on??01/20/21.??Satisfied by Dell Hernandes  ??? ? ? ?Lipid Screening on??10/02/20.??Satisfied by Jodie Raya  ??? ? ? ?Obesity Screening on??01/20/21.??Satisfied by Sonja Bunch  ??? ? ? ?Pneumococcal Vaccine on??10/02/20.??Satisfied by Rachel Singh  ??? ? ? ?Tetanus Vaccine on??10/02/20.??Satisfied by Rachel Singh  ??? ? ? ?Zoster Vaccine on??10/08/20.??Satisfied by Rachel Singh  ?  Lab Results  Test Name Test Result Date/Time   UA Leuk Est Trace UA 01/20/2021 11:31 CST   UA WBC cnt 5-10 (Abnormal) 01/20/2021 11:31 CST   UA RBC None Seen 01/20/2021 11:31 CST   UA Bact Trace UA 01/20/2021 11:31 CST   UA Squam Epithelial Few 01/20/2021 11:31 CST       Patient condition discussed?in detail with nurse practitioner.??Agree with plan of care?and follow-up.  ?

## 2022-05-03 NOTE — HISTORICAL [facility]
This is a historical note converted from Jose Elias. Formatting and pictures may have been removed.  Please reference Jose Elias for original formatting and attached multimedia. Chief Complaint  cpx., c/o of bruised finger lt hand  History of Present Illness  Patient is here for ?her?annual wellness -?labs not done, fasting. Denies any side effects to current medications.? Tolerating current meds and?feels that they are effective.? Denies change in social or family history.?  ?   She is unsure?about how much vitamin D she is taking every day.?  ?   She reports significant fatigue. ?She wakes up feeling rested. ?Her fatigue starts?after lunchtime.  ?   Will start PT next week for tendinitis?in her?right foot.  ?   Jammed left hand middle finger a few weeks ago. Tenderness?and redness over?her?first joint.  ?   She continues to suffer with restless leg syndrome.? She has trouble sitting all day because her legs?feel?jittery.? She takes?pramipexole?around 6:30 each evening?and this does not?control her symptoms.? She has previously tried ropinirole?without relief?of her symptoms.  ?   Social Hx: Water:? 2 glasses/day,? Caff: 1c coffee/day,? ETOH: occ, ?Tob: none,? Exercise: none, ?Occupation: runs a , ?Marital Status:??, ?Children: 3 children, 5 grands  ?   Family Hx:  Father 88: Kidney cancer, cholesterol  Mother 88: Diabetes, CVA, CAD  Brother 59: CAD at 51, hypertension, hyperchol  Brother 58: CAD at 55  ?  ?  Surgical Hx:  Right cataract removal 8/2020  Left cataract removal 8/2020  Appendectomy  Breast biopsy  Cholecystectomy  Colonoscopy  Total Hysterectomy  ?  Specialists:  Last Colonoscopy: 2012, Dr. Aldana, unsure of when repeat recommended.  Last MMG: over 1 year ago  Last DEXA: over 2 years ago  Last PAP: none since hysterectomy  Podiatrist: Dr. Choudhury  Cardio: Dr. Boucher, annual OV  Endo:  Risk - managing hypothyroidism and hyperchol  ?   Vaccinations:  Tetanus?- last one in 2009  Flu  -?declines  Shingles - +hx of shingles  Pneumonia?- no history  ?  ?  Review of Systems  General:?? Patient reports energy level is ?poor. Denies weight change.??Denies fever,chills, night sweats, or weakness. ?Reports fatigue.  Integument:?? Denies any nevus changes, rashes, urticaria,??or sores.??Also denies itching or areas of numbness.  HEENT:?? Denies vision changes or eye pain.??No sore throat, ear pain, sinus pressure or discharge.  Cardiovascular:?? Denies chest pain, palpitations, dyspnea on exertion, orthopnea.  Respiratory:?? No cough, wheezing, shortness of breath, or sputum.  GI:?? Denies nausea, emesis, constipation, diarrhea, melena, hematochezia or abdominal pain  :?? No frequency, urgency, hematuria, discharge, or incontinence  MS:?? Denies myalgias, joint effusion, edema, or weakness  Neuro:?? No headaches, numbness in extremities, tingling, dizziness, or weakness  Endo:?? Denies polyuria, polydipsia, polyphagia  Heme:?? No abnormal bleeding or bruising. No lymph node enlargement or pain.  ?  Physical Exam  Vitals & Measurements  HR:?86(Peripheral)? BP:?140/86?  HT:?175.00?cm? WT:?91.300?kg? BMI:?29.81?  General:?? Well-developed and??nourished, no apparent distress, alert and oriented??4.  Integument:?? Skin is intact with no erythema.??No pustules or vesicles.??No rash or scale. No Lymphadenopathy.??No urticaria.??No abnormal nevi.  HEENT:?? PERRLA, EOMI ; TMs and EACs clear, normal turbinates with no erythema, normal mucosa, no sinus tenderness; no erythema or exudate of mouth or pharynx.  Neck:?? Supple, no lymphadenopathy, no thyromegaly, no bruits, no jugular venous distention.  Cardiovascular:?? Regular rhythm and rate, no murmurs, radial and dorsal pedal pulses 2+ bilaterally.  Respiratory:?? Lungs clear to auscultation bilaterally, no wheezes, no crackles, no rhonchi.??Good air movement.  Abdomen:?? NABS, soft, nontender, no hepatosplenomegaly, no masses, no guarding or rebound.?  MS:??  Left hand, middle finger:?Full range of motion with tenderness to?DIP. ?Ecchymosis?noted to?DIP as well.  Neuro:?? CN II-XII intact, no motor sensory deficits.  Psych: Normal affect, patient calm, good historian, patient answers questions?appropriately. Good hygiene.  ?  Assessment/Plan  1.?Wellness examination?Z00.00  ?Age-appropriate wellness topics discussed. ?Will?find out when patients next colonoscopy is due from her GI physician.  Ordered:  Clinic Follow-Up Wellness, *Est. 10/02/21 3:00:00 CDT, Order for future visit, Wellness examination, [facility]  Comprehensive Metabolic Panel, Routine collect, 10/02/20 10:45:00 CDT, Blood, Stop date 10/02/20 10:45:00 CDT, Lab Collect, Wellness examination  Hypercholesteremia, 10/02/20 10:45:00 CDT  Lipid Panel, Routine collect, 10/02/20 10:45:00 CDT, Blood, Stop date 10/02/20 10:45:00 CDT, Lab Collect, Wellness examination  Hypercholesteremia, 10/02/20 10:45:00 CDT  Preventative Health Care Est 65 yrs and over [address]663 PC, Wellness examination, [facility], 10/02/20 10:37:00 CDT  ?  2.?Hypercholesteremia?E78.00  ?Lab today: CMP, lipid panel.  Will forward lab results to Dr. Mcdowell.  Ordered:  Comprehensive Metabolic Panel, Routine collect, 10/02/20 10:45:00 CDT, Blood, Stop date 10/02/20 10:45:00 CDT, Lab Collect, Wellness examination  Hypercholesteremia, 10/02/20 10:45:00 CDT  Lipid Panel, Routine collect, 10/02/20 10:45:00 CDT, Blood, Stop date 10/02/20 10:45:00 CDT, Lab Collect, Wellness examination  Hypercholesteremia, 10/02/20 10:45:00 CDT  ?  3.?Hypothyroidism?E03.9  ?Continue to follow-up with Dr. Mcdowell.  ?  4.?Restless leg syndrome?G25.81  ?Refer to neurologist.  Ordered:  External Referral, Restless leg syndrome, Neuro, 10/02/20 10:20:00 CDT, Restless leg syndrome  Office/Outpatient Visit Level 3 Established 25390 PC, Restless leg syndrome  Finger injury, [facility], 10/02/20 10:36:00 CDT  ?  5.?Breast cancer screening by mammogram?Z12.31  ?Screening  mammogram ordered.  Ordered:  Schedule Diagnostics Study, Screening MMG, Contact patient, last MMG >1 year ago, open to changing imaging locations, please discuss with patient, 10/02/20 10:03:00 CDT, Breast cancer screening by mammogram  ?  6.?Menopause?Z78.0  ?DEXA scan ordered.  Ordered:  Schedule Diagnostics Study, DEXA, with MMG, 10/02/20 10:03:00 CDT, Menopause  ?  7.?Fatigue?R53.83  ?Lab today: Vitamin D  Ordered:  Vitamin D, 25-Hydroxy Level, Routine collect, 10/02/20 10:45:00 CDT, Blood, Stop date 10/02/20 10:45:00 CDT, Lab Collect, Fatigue, 10/02/20 10:45:00 CDT  ?  8.?Finger injury?S69.90XA  Xray to radiologist  ?Will refer to ortho if symptoms persist.  Ordered:  Office/Outpatient Visit Level 3 Established 03822 PC, Restless leg syndrome  Finger injury, [facility], 10/02/20 10:36:00 CDT  ?  9.?Immunization due?Z23  ?We do not have Shingrix available today. ?Will call patient when?it is available.  Influenza vaccine declined.  Prevnar 13?and Tdap today.  Pneumovax 23 in 1 year.  ?  Referrals  External Referral, Restless leg syndrome, Neuro, 10/02/20 10:20:00 CDT, Restless leg syndrome  Clinic Follow-Up Wellness, *Est. 10/02/21 3:00:00 CDT, Order for future visit, Wellness examination, [facility]   Problem List/Past Medical History  Ongoing  Cataract  Hypercholesteremia  Hypothyroidism  Restless leg syndrome  Historical  No qualifying data  Procedure/Surgical History  41373 - LT CATARACT W/IOL 1 STA PHACO (Left) (08/28/2020)  Extracapsular cataract removal with insertion of intraocular lens prosthesis (1 stage procedure), manual or mechanical technique (eg, irrigation and aspiration or phacoemulsification); without endoscopic cyclophotocoagulation (08/28/2020)  Replacement of Left Lens with Synthetic Substitute, Percutaneous Approach (08/28/2020)  16555 - RT CATARACT W/IOL 1 STA PHACO (Right) (08/04/2020)  64583 - RT CATARACT W/IOL 1 STA PHACO (Right) (08/04/2020)  Extracapsular cataract removal with  insertion of intraocular lens prosthesis (1 stage procedure), manual or mechanical technique (eg, irrigation and aspiration or phacoemulsification); without endoscopic cyclophotocoagulation (08/04/2020)  Replacement of Right Lens with Synthetic Substitute, Percutaneous Approach (08/04/2020)  Appendectomy  breast biopsy  Cholecystectomy  Colonoscopy  Hysterectomy   Medications  CoQ10, 300 mg, Oral, Daily  gabapentin 300 mg oral capsule, 300 mg= 1 cap(s), Oral, TID  hydroCHLOROthiazide 12.5 mg oral capsule, 12.5 mg= 1 cap(s), Oral, Daily  LEVOTHYROXINE 175 MCG TAB, 175 mcg= 1 tab(s), Oral, Daily  lovastatin 20 mg oral tablet, See Instructions  MONTELUKAST SOD 10MG, 10 mg= 1 tab(s), Oral, Daily  PRAMIPEXOLE 0 5 MG TABLET  Vitamin D, Oral  Allergies  No Known Allergies  Social History  Abuse/Neglect  No, 08/28/2020  No, 08/04/2020  No, 10/23/2019  Alcohol  Never, 01/20/2016  Substance Use  Never, 08/28/2020  Tobacco  Never (less than 100 in lifetime), N/A, 08/28/2020  Never (less than 100 in lifetime), N/A, 10/23/2019  Never (less than 100 in lifetime), N/A, 03/26/2019  Family History  Family history is unknown  Immunizations  Vaccine Date Status   pneumococcal 13-valent conjugate vaccine 10/02/2020 Given   tetanus/diphtheria/pertussis, acel(Tdap) 10/02/2020 Given   tetanus/diphth/pertuss (Tdap) adult/adol 05/28/2009 Recorded   Health Maintenance  Health Maintenance  ???Pending?(in the next year)  ??? ??OverDue  ??? ? ? ?Pneumococcal Vaccine due??and every?  ??? ? ? ?Influenza Vaccine due??10/01/19??and every 1??day(s)  ??? ? ? ?Alcohol Misuse Screening due??01/02/20??and every 1??year(s)  ??? ? ? ?Cognitive Screening due??01/02/20??and every 1??year(s)  ??? ? ? ?Fall Risk Assessment due??01/02/20??and every 1??year(s)  ??? ??Due?  ??? ? ? ?ADL Screening due??10/02/20??and every 1??year(s)  ??? ? ? ?Aspirin Therapy for CVD Prevention due??10/02/20??and every 1??year(s)  ??? ? ? ?Bone Density Screening  due??10/02/20??Variable frequency  ??? ? ? ?Breast Cancer Screening due??10/02/20??and every?  ??? ? ? ?Colorectal Screening due??10/02/20??and every 10??year(s)  ??? ? ? ?Depression Screening due??10/02/20??and every?  ??? ? ? ?Zoster Vaccine due??10/02/20??and every?  ??? ??Due In Future?  ??? ? ? ?Obesity Screening not due until??01/01/21??and every 1??year(s)  ??? ? ? ?Advance Directive not due until??01/02/21??and every 1??year(s)  ??? ? ? ?Functional Assessment not due until??01/02/21??and every 1??year(s)  ??? ? ? ?Diabetes Screening not due until??09/22/21??and every 3??year(s)  ???Satisfied?(in the past 1 year)  ??? ??Satisfied?  ??? ? ? ?Advance Directive on??08/28/20.??Satisfied by Vivi Navarrete RN  ??? ? ? ?Blood Pressure Screening on??10/02/20.??Satisfied by Rachel Singh  ??? ? ? ?Body Mass Index Check on??10/02/20.??Satisfied by Rachel Singh  ??? ? ? ?Functional Assessment on??08/28/20.??Satisfied by Vivi Navarrete RN  ??? ? ? ?Influenza Vaccine on??10/23/19.??Satisfied by Kristen Alvarez LPN  ??? ? ? ?Obesity Screening on??10/02/20.??Satisfied by Rachel Singh  ??? ? ? ?Pneumococcal Vaccine on??10/02/20.??Satisfied by Rachel Singh  ??? ? ? ?Tetanus Vaccine on??10/02/20.??Satisfied by Rachel Singh  ?      Patient condition discussed?in detail with nurse practitioner.??Agree with plan of care?and follow-up.  ?

## 2022-10-07 PROBLEM — E55.9 VITAMIN D DEFICIENCY: Status: ACTIVE | Noted: 2022-10-07

## 2022-10-07 PROBLEM — E03.9 HYPOTHYROIDISM: Status: ACTIVE | Noted: 2022-10-07

## 2022-10-07 PROBLEM — E78.00 HYPERCHOLESTEROLEMIA: Status: ACTIVE | Noted: 2022-10-07

## 2022-10-07 PROBLEM — F41.9 ANXIETY: Status: ACTIVE | Noted: 2022-10-07

## 2023-04-11 PROBLEM — K21.9 GASTROESOPHAGEAL REFLUX DISEASE: Status: ACTIVE | Noted: 2023-04-11

## 2023-10-10 PROBLEM — R60.0 PERIPHERAL EDEMA: Status: ACTIVE | Noted: 2023-10-10

## 2024-04-04 PROCEDURE — 82607 VITAMIN B-12: CPT | Performed by: FAMILY MEDICINE

## 2024-04-04 PROCEDURE — 83735 ASSAY OF MAGNESIUM: CPT | Performed by: FAMILY MEDICINE

## 2024-04-04 PROCEDURE — 83540 ASSAY OF IRON: CPT | Performed by: FAMILY MEDICINE

## 2025-06-20 ENCOUNTER — TELEPHONE (OUTPATIENT)
Dept: PHARMACY | Facility: CLINIC | Age: 70
End: 2025-06-20
Payer: COMMERCIAL

## 2025-06-20 NOTE — TELEPHONE ENCOUNTER
[facility]/Population Health Chart Review & Patient Outreach Details For Medication Adherence Project    Reason for Outreach Encounter: 3rd Party payor non-compliance report ([employer], [employer], [employer], etc)  2.  Patient Outreach Method: Reviewed patient chart   3.   Medication in question:    Hyperlipidemia Medications              lovastatin (MEVACOR) 20 MG tablet Take 1 tablet (20 mg total) by mouth once daily.                  lovastatin  last filled  6/5/25 for 30 day supply      4.  Reviewed and or Updates Made To: Patient Chart  5. Outreach Outcomes and/or actions taken: Patient filled medication and is on track to be adherent  Additional Notes:

## 2025-07-12 ENCOUNTER — TELEPHONE (OUTPATIENT)
Dept: PHARMACY | Facility: CLINIC | Age: 70
End: 2025-07-12
Payer: COMMERCIAL

## 2025-07-12 NOTE — TELEPHONE ENCOUNTER
[facility]/Population Health Chart Review & Patient Outreach Details For Medication Adherence Project    Reason for Outreach Encounter: 3rd Party payor non-compliance report ([employer], [employer], [employer], etc)  2.  Patient Outreach Method: Reviewed patient chart   3.   Medication in question:    Hyperlipidemia Medications              lovastatin (MEVACOR) 20 MG tablet Take 1 tablet (20 mg total) by mouth once daily.                  lovastatin  last filled  7/3/25 for 30 day supply      4.  Reviewed and or Updates Made To: Patient Chart  5. Outreach Outcomes and/or actions taken: Patient filled medication and is on track to be adherent  Additional Notes:

## 2025-08-05 ENCOUNTER — TELEPHONE (OUTPATIENT)
Dept: PHARMACY | Facility: CLINIC | Age: 70
End: 2025-08-05
Payer: COMMERCIAL

## 2025-08-05 NOTE — TELEPHONE ENCOUNTER
[facility]/Population Health Chart Review & Patient Outreach Details For Medication Adherence Project    Reason for Outreach Encounter: 3rd Party payor non-compliance report ([employer], [employer], C, etc)  2.  Patient Outreach Method: Nanore[employer] message  3.   Medication in question: lovatatin   LAST FILLED: 7/3/25 for 30 day supply  Hyperlipidemia Medications              lovastatin (MEVACOR) 20 MG tablet Take 1 tablet (20 mg total) by mouth once daily.               4.  Reviewed and or Updates Made To: Patient Chart  5. Outreach Outcomes and/or actions taken: Sent inquiry to patient: Waiting for response.